# Patient Record
Sex: MALE | Employment: UNEMPLOYED | URBAN - METROPOLITAN AREA
[De-identification: names, ages, dates, MRNs, and addresses within clinical notes are randomized per-mention and may not be internally consistent; named-entity substitution may affect disease eponyms.]

---

## 2019-12-18 ENCOUNTER — TELEPHONE (OUTPATIENT)
Dept: PHARMACY | Facility: CLINIC | Age: 49
End: 2019-12-18

## 2019-12-18 NOTE — TELEPHONE ENCOUNTER
LVM to notify the patient we received the prescription for Epclusa, and to see if he has any active prescription insurance. We will continue to follow up.

## 2019-12-20 ENCOUNTER — TELEPHONE (OUTPATIENT)
Dept: PHARMACY | Facility: CLINIC | Age: 49
End: 2019-12-20

## 2019-12-20 NOTE — TELEPHONE ENCOUNTER
DOCUMENTATION ONLY:  [facility] Epclusa does not required prior authorization with insurance company.     Co-pay: $3.00    Patient Assistance IS NOT required.     Forward to clinical pharmacist for consult & shipment.

## 2019-12-20 NOTE — TELEPHONE ENCOUNTER
Called patient to let him know that his insurance is now working and that we would be back in touch with him soon as soon as a benefits investigation was conducted.

## 2019-12-20 NOTE — TELEPHONE ENCOUNTER
Initial Epclusa consult completed on . Epclusa will be shipped on  to arrive on  at patient's home via [employer]. $3.00 from -004.  Patient plans to start Epclusa on - Confirmed 2 patient identifies - name and . Therapy appropriate.      Counseling Reviewed:      1. Patient MUST take Epclusa at the same time every day with or without food.       2. Patient MUST avoid acid reducers without consulting myself or provider first. Antacids are to be spaced out at least 4 hours apart from Epclusa.      3. Common side effects include: headaches and fatigue.  Headache: Patient may treat with Tylenol, dose should not exceed 2000 mg per day.      4.Medication list reviewed. No allergies noted. Patient MUST contact myself or provider prior to starting any new OTC, herbal, or prescription drugs to avoid potential DDIs.     DDIs: Patient advised to separate Epclusa from magnesium oxide, calcium supplement, and antacids. Patient reports he uses Tums on occasion.     Discussed the importance of staying well hydrated while on therapy. Compliance stressed, patient to take missed doses as soon as remember, but NOT to take 2 doses in one day. Patient will report questions or concerns to myself or practitioner.  Patient verbalizes understanding. Patient plans to start Epclusa on .  Consultation included: indication, goals of treatment, administration, storage and handling, side effects, how to handle missed doses, the importance of laboratory monitoring, the importance of keeping all follow up appointment. Patient understands to report any medication changes to [facility] and provider. All questions answered and addressed to patient's satisfaction, Follow-up will occur 7 days from the start of treatment, [facility] to contact patient in 3 weeks for refills. Provider informed of anticipated start date 12/15/19.

## 2020-01-02 ENCOUNTER — TELEPHONE (OUTPATIENT)
Dept: PHARMACY | Facility: CLINIC | Age: 50
End: 2020-01-02

## 2020-01-10 ENCOUNTER — TELEPHONE (OUTPATIENT)
Dept: PHARMACY | Facility: CLINIC | Age: 50
End: 2020-01-10

## 2020-01-10 NOTE — TELEPHONE ENCOUNTER
Epclusa (2 of 3) refill and reassessment attempted. (Attempt 1). N/A. LVM for call back. Will f/u with patient if no return call.

## 2020-01-13 NOTE — TELEPHONE ENCOUNTER
Epclusa (2 of 3) refill confirmed and reassessment complete. We will ship Epclusa refill on  via [employer] to arrive on 1/15. $3.00 copay- 004. Confirmed 2 patient identifiers - name and . Therapy appropriate.     Patient has 8-9 doses of Epclusa remaining and takes it around 12:00 pm daily. No missed doses, no new medications, no new allergies or health conditions reported at this time. Allergies reviewed and medication reconciliation complete (reviewed and documented in [facility] and [facility]).  Disease education reviewed (including transmission and prevention). Patient counseled on importance of maintaining adherence and keeping lab appointments which were scheduled. All questions answered and addressed to patients satisfaction. Advised to call [facility] and provider if any issues arise.  Pt verbalized understanding.   - Patient reports experiencing headaches, but is tolerable. Patient reminded that he may take Tylenol < 2000 mg per day for headaches. Patient verbalized understanding.

## 2020-02-10 ENCOUNTER — TELEPHONE (OUTPATIENT)
Dept: PHARMACY | Facility: CLINIC | Age: 50
End: 2020-02-10

## 2020-02-10 NOTE — TELEPHONE ENCOUNTER
Epclusa refill (3 of 3) confirmed and reassessment complete. We will ship Epclusa refill on  via [employer] to arrive on . $3.00 copay- 004. Confirmed 2 patient identifiers - name and . Therapy appropriate.     Patient has 7 doses of Epclusa remaining and takes it around 7am daily.  Pt reports they are not having any side effects so far. No missed doses, no new medications, no new allergies or health conditions reported at this time. Allergies reviewed and medication reconciliation complete (reviewed and documented in [facility] and [facility]).  Disease education reviewed (including transmission and prevention). Patient counseled on importance of maintaining adherence and keeping lab appointments which were scheduled. All questions answered and addressed to patients satisfaction. Advised to call [facility] and provider if any issues arise.  Pt verbalized understanding.

## 2020-02-10 NOTE — TELEPHONE ENCOUNTER
Epclusa refill (3 of 3) and reassessment attempted (attempt 1). NA, LVM for call back. Will f/u if no call back.   - $3.00 copay